# Patient Record
Sex: MALE | Race: WHITE | NOT HISPANIC OR LATINO | Employment: OTHER | URBAN - NONMETROPOLITAN AREA
[De-identification: names, ages, dates, MRNs, and addresses within clinical notes are randomized per-mention and may not be internally consistent; named-entity substitution may affect disease eponyms.]

---

## 2024-11-20 ENCOUNTER — PATIENT ROUNDING (BHMG ONLY) (OUTPATIENT)
Dept: CARDIOLOGY | Facility: CLINIC | Age: 69
End: 2024-11-20
Payer: MEDICARE

## 2024-11-20 ENCOUNTER — OFFICE VISIT (OUTPATIENT)
Dept: CARDIOLOGY | Facility: CLINIC | Age: 69
End: 2024-11-20
Payer: MEDICARE

## 2024-11-20 VITALS
DIASTOLIC BLOOD PRESSURE: 115 MMHG | OXYGEN SATURATION: 95 % | HEART RATE: 93 BPM | BODY MASS INDEX: 26.8 KG/M2 | HEIGHT: 68 IN | SYSTOLIC BLOOD PRESSURE: 217 MMHG | WEIGHT: 176.8 LBS

## 2024-11-20 DIAGNOSIS — R55 SYNCOPE AND COLLAPSE: ICD-10-CM

## 2024-11-20 DIAGNOSIS — I26.99 RECURRENT PULMONARY EMBOLISM: ICD-10-CM

## 2024-11-20 DIAGNOSIS — E78.2 MIXED HYPERLIPIDEMIA: ICD-10-CM

## 2024-11-20 DIAGNOSIS — I10 ESSENTIAL HYPERTENSION: Primary | ICD-10-CM

## 2024-11-20 DIAGNOSIS — R06.02 SOB (SHORTNESS OF BREATH): ICD-10-CM

## 2024-11-20 DIAGNOSIS — R55 PRE-SYNCOPE: ICD-10-CM

## 2024-11-20 DIAGNOSIS — R01.1 HEART MURMUR: ICD-10-CM

## 2024-11-20 DIAGNOSIS — R00.2 PALPITATION: ICD-10-CM

## 2024-11-20 DIAGNOSIS — I82.499 DEEP VEIN THROMBOSIS (DVT) OF OTHER VEIN OF LOWER EXTREMITY, UNSPECIFIED CHRONICITY, UNSPECIFIED LATERALITY: ICD-10-CM

## 2024-11-20 PROBLEM — I82.409 DVT (DEEP VENOUS THROMBOSIS): Status: ACTIVE | Noted: 2024-11-20

## 2024-11-20 PROBLEM — R42 DIZZINESS: Status: ACTIVE | Noted: 2024-11-20

## 2024-11-20 PROBLEM — Z79.01 LONG TERM (CURRENT) USE OF ANTICOAGULANTS: Status: ACTIVE | Noted: 2024-11-20

## 2024-11-20 PROBLEM — Z72.0 SMOKELESS TOBACCO USE: Status: ACTIVE | Noted: 2024-11-20

## 2024-11-20 RX ORDER — OLMESARTAN MEDOXOMIL 20 MG/1
20 TABLET ORAL DAILY
Qty: 30 TABLET | Refills: 11 | Status: SHIPPED | OUTPATIENT
Start: 2024-11-20

## 2024-11-20 RX ORDER — METOPROLOL TARTRATE 25 MG/1
12.5 TABLET, FILM COATED ORAL 2 TIMES DAILY
Qty: 30 TABLET | Refills: 11 | Status: SHIPPED | OUTPATIENT
Start: 2024-11-20

## 2024-11-20 RX ORDER — WARFARIN SODIUM 5 MG/1
TABLET ORAL
COMMUNITY
End: 2024-11-20 | Stop reason: ALTCHOICE

## 2024-11-20 RX ORDER — DIPHENOXYLATE HYDROCHLORIDE AND ATROPINE SULFATE 2.5; .025 MG/1; MG/1
1 TABLET ORAL DAILY
COMMUNITY

## 2024-11-20 RX ORDER — AMLODIPINE BESYLATE 2.5 MG/1
2.5 TABLET ORAL DAILY
Qty: 30 TABLET | Refills: 11 | Status: SHIPPED | OUTPATIENT
Start: 2024-11-20

## 2024-11-20 NOTE — PROGRESS NOTES
November 20, 2024    Hello, may I speak with Carlton Coy?    My name is HYUN MARLOW      I am  with [facility]  [address] [place] [id number] [phone].    Before we get started may I verify your date of birth? 1955    I am calling to officially welcome you to our practice and ask about your recent visit. Is this a good time to talk? no    NO ANSWER, UNABLE TO LVM      Thank you, and have a great day.

## 2024-11-20 NOTE — PROGRESS NOTES
"Subjective   Carlton Coy is a 69 y.o. male     Chief Complaint   Patient presents with   • Establish Care     Here for eval. Per pcp   • Shortness of Breath   • Deep Vein Thrombosis   • Pulmonary Embolism   • Hypertension   • Hyperlipidemia       PROBLEM LIST:     Pre-syncope  HTN  Hyperlipidemia  PE / DVT  approx. 20-25 yrs. Ago. On coumadin. Monitored by pcp  SOB  Smokeless tobacco use  Heart murmur    Specialty Problems    None        HPI:    Mr. Wu Coy is a 69-year-old patient of Radha Jung seen today to establish care.    Mr. Coy has a history of unprovoked DVT with subsequent pulmonary embolism.  He ultimately underwent IVC filter placement and has been managed on warfarin.  He has had no symptoms of recurrence.    Mr. Coy is very physically active per his report.  He does active yard work and heavy physical activity without chest pain or undue dyspnea.  He states that he does not have as much \"energy\" as he did last year but describes no increase in exertional dyspnea or decrease in functional capacity.    The patient describes no symptoms to implicate heart failure or malignant dysrhythmia.  He has no symptoms to suggest peripheral arterial disease or arterial embolic events.                  PRIOR MEDICATIONS    Current Outpatient Medications on File Prior to Visit   Medication Sig Dispense Refill   • multivitamin (MULTIVITAMIN PO) Take 1 tablet by mouth Daily.     • warfarin (COUMADIN) 5 MG tablet 5 mg daily except doesn't take on Tuesday's. PCP monitors       No current facility-administered medications on file prior to visit.       ALLERGIES:    Penicillin g    PAST MEDICAL HISTORY:    Past Medical History:   Diagnosis Date   • Deep vein thrombosis    • Hyperlipidemia    • Hypertension    • Pulmonary embolism        SURGICAL HISTORY:    Past Surgical History:   Procedure Laterality Date   • APPENDECTOMY     • OTHER SURGICAL HISTORY      filter placed in left groin for HX clots approx. 20-25 yrs. " "ago       SOCIAL HISTORY:    Social History     Socioeconomic History   • Marital status:    Tobacco Use   • Smoking status: Never   • Smokeless tobacco: Current   Substance and Sexual Activity   • Alcohol use: Never   • Drug use: Never       FAMILY HISTORY:    Family History   Problem Relation Age of Onset   • Heart attack Mother    • Pulmonary embolism Mother    • Heart attack Father        Review of Systems   Constitutional:  Positive for fatigue (depends on activity. does fine with doing things below the waist). Negative for chills, diaphoresis, fever and unexpected weight change.   HENT: Negative.     Eyes:  Positive for visual disturbance (glasses prn).   Respiratory:  Positive for shortness of breath.         Denies orthopnea/PND   Cardiovascular:  Positive for palpitations (occured over the sumnmer with diet change. stopped with changing diet back) and leg swelling (if sits around alot). Negative for chest pain.   Gastrointestinal:  Positive for nausea. Negative for blood in stool, constipation and diarrhea.   Endocrine: Positive for cold intolerance.   Genitourinary: Negative.    Musculoskeletal:  Positive for arthralgias and myalgias.        Denies leg cramps with ambulation   Skin: Negative.    Allergic/Immunologic: Negative.    Neurological:  Positive for dizziness, syncope (first episode in May occured while \"experimenting\" on foods while walking to garage to show son something. last episode approx. 2 week later while shaving in [place].) and light-headedness.   Hematological:  Bruises/bleeds easily (on coumadin).   Psychiatric/Behavioral:  Positive for sleep disturbance.        VISIT VITALS:  Vitals:    11/20/24 1133 11/20/24 1225   BP: (!) 173/101 (!) 217/115   BP Location: Left arm Left arm   Patient Position: Sitting Sitting   Pulse: 93    SpO2: 95%    Weight: 80.2 kg (176 lb 12.8 oz)    Height: 172.7 cm (68\")       BP (!) 217/115 (BP Location: Left arm, Patient Position: Sitting)   Pulse 93   " "Ht 172.7 cm (68\")   Wt 80.2 kg (176 lb 12.8 oz)   SpO2 95%   BMI 26.88 kg/m²     RECENT LABS:    Objective       Physical Exam  Vitals and nursing note reviewed.   Constitutional:       General: He is not in acute distress.     Appearance: He is well-developed.   HENT:      Head: Normocephalic and atraumatic.   Eyes:      Conjunctiva/sclera: Conjunctivae normal.      Pupils: Pupils are equal, round, and reactive to light.   Neck:      Vascular: No carotid bruit, hepatojugular reflux or JVD.      Trachea: No tracheal deviation.      Comments: Nl. Carotid upstrokes  Cardiovascular:      Rate and Rhythm: Normal rate and regular rhythm.      Pulses:           Radial pulses are 2+ on the right side and 2+ on the left side.      Heart sounds: S1 normal and S2 normal. Murmur heard.      S4 (loud) sounds present.      Comments: 1/6 TR  NO MR  NO AI  Pulmonary:      Effort: Pulmonary effort is normal.      Breath sounds: Normal breath sounds. No wheezing, rhonchi or rales.      Comments: Nl. Expir. Phase  Nl. Breath sound intensity  Abdominal:      General: Bowel sounds are normal. There is no distension or abdominal bruit.      Palpations: Abdomen is soft. There is no mass.      Tenderness: There is no abdominal tenderness. There is no guarding or rebound.      Comments: No organomegaly   Musculoskeletal:         General: No tenderness or deformity. Normal range of motion.      Cervical back: Normal range of motion and neck supple.      Right lower leg: No edema.      Left lower leg: No edema.      Comments: LLE, no edema, palpable pedal pulses  RLE, no edema, palpable pedal pulses   Skin:     General: Skin is warm and dry.      Coloration: Skin is not pale.      Findings: No erythema or rash.   Neurological:      Mental Status: He is alert and oriented to person, place, and time.   Psychiatric:         Behavior: Behavior normal.         Thought Content: Thought content normal.         Judgment: Judgment normal. "         ECG 12 Lead    Date/Time: 11/20/2024 12:09 PM  Performed by: Tomas Garcia MD    Authorized by: Tomas Garcia MD  Previous ECG: no previous ECG available  Comments: Restarted normal sinus rhythm at 70 bpm.  Right bundle branch block, right axis deviation.          Assessment & Plan   #1.  Severe hypertension.  Blood pressures as recorded above.  We will start amlodipine 2.5 mg daily metoprolol 12.5 mg twice daily, and olmesartan 20 mg daily.  The patient will keep a serial blood pressure log at home.    2.  History of unprovoked DVT/PE with a strong family history of DVT.  The patient will hold warfarin for 48 hours and start Xarelto 20 mg daily.    3.  Exertional dyspnea.  I do not believe this is an anginal equivalent or a marker for LV systolic dysfunction.  I would like to see the patient back after managing blood pressure to make a determination whether further evaluation is indicated.    4.  Presyncope.  The patient describes several episodes in May of presyncope.  He had no palpitations, chest pain, or shortness of breath at that time.  He has had no recurrence of symptoms.  We will monitor closely to determine if further evaluation is indicated.    5.  Mr. Coy will follow with Radha Erich as instructed we will plan on seeing him in follow-up in 4 to 6 weeks or on appearing basis as discussed.   Diagnosis Plan   1. Essential hypertension        2. Mixed hyperlipidemia        3. Deep vein thrombosis (DVT) of other vein of lower extremity, unspecified chronicity, unspecified laterality        4. Recurrent pulmonary embolism        5. Syncope and collapse  ECG 12 Lead      6. Palpitation  ECG 12 Lead      7. SOB (shortness of breath)  ECG 12 Lead      8. Pre-syncope        9. Heart murmur            No follow-ups on file.         Carlton Coy  reports that he has never smoked. He uses smokeless tobacco. I have educated him on the risk of diseases from using tobacco products such as cancer,  COPD, and heart disease.     I advised him to quit and he is not willing to quit.    I spent 3  minutes counseling the patient.          BMI is >= 25 and <30. (Overweight) The following options were offered after discussion;: pcp addressing       Advance Care Planning   ACP discussion was held with the patient during this visit. Patient does not have an advance directive, declines further assistance.            Electronically signed by:    Scribed for Tomas Garcia MD by Jeanette Norwood LPN on November 20, 2024  at 12:27 EST    I, Tomas Garcia MD personally performed the services described in this documentation as scribed by the above named individual in my presence, and it is both accurate and complete. November 20, 2024 12:27 EST      Dictated Utilizing Dragon Dictation: Part of this note may be an electronic transcription/translation of spoken language to printed text using the Dragon Dictation System.

## 2024-11-21 ENCOUNTER — TELEPHONE (OUTPATIENT)
Dept: CARDIOLOGY | Facility: CLINIC | Age: 69
End: 2024-11-21
Payer: MEDICARE

## 2024-11-21 NOTE — TELEPHONE ENCOUNTER
RELAY  Lvm to obtain the MBI number on the patients medicare card. To link to Medicare advantage plan.

## 2024-12-09 ENCOUNTER — OFFICE VISIT (OUTPATIENT)
Dept: CARDIOLOGY | Facility: CLINIC | Age: 69
End: 2024-12-09
Payer: MEDICARE

## 2024-12-09 VITALS
WEIGHT: 135 LBS | SYSTOLIC BLOOD PRESSURE: 173 MMHG | HEIGHT: 68 IN | HEART RATE: 58 BPM | BODY MASS INDEX: 20.46 KG/M2 | DIASTOLIC BLOOD PRESSURE: 70 MMHG | OXYGEN SATURATION: 97 %

## 2024-12-09 DIAGNOSIS — R01.1 HEART MURMUR: ICD-10-CM

## 2024-12-09 DIAGNOSIS — I26.99 RECURRENT PULMONARY EMBOLISM: ICD-10-CM

## 2024-12-09 DIAGNOSIS — E78.2 MIXED HYPERLIPIDEMIA: ICD-10-CM

## 2024-12-09 DIAGNOSIS — Z72.0 SMOKELESS TOBACCO USE: ICD-10-CM

## 2024-12-09 DIAGNOSIS — I10 ESSENTIAL HYPERTENSION: Primary | ICD-10-CM

## 2024-12-09 DIAGNOSIS — R06.02 SOB (SHORTNESS OF BREATH): ICD-10-CM

## 2024-12-09 DIAGNOSIS — R00.2 PALPITATION: ICD-10-CM

## 2024-12-09 DIAGNOSIS — I82.499 DEEP VEIN THROMBOSIS (DVT) OF OTHER VEIN OF LOWER EXTREMITY, UNSPECIFIED CHRONICITY, UNSPECIFIED LATERALITY: ICD-10-CM

## 2024-12-09 RX ORDER — METOPROLOL TARTRATE 25 MG/1
25 TABLET, FILM COATED ORAL 2 TIMES DAILY
Qty: 60 TABLET | Refills: 11 | Status: SHIPPED | OUTPATIENT
Start: 2024-12-09

## 2024-12-09 RX ORDER — OLMESARTAN MEDOXOMIL 40 MG/1
40 TABLET ORAL DAILY
Qty: 30 TABLET | Refills: 11 | Status: SHIPPED | OUTPATIENT
Start: 2024-12-09

## 2024-12-09 RX ORDER — AMLODIPINE BESYLATE 5 MG/1
5 TABLET ORAL DAILY
Qty: 30 TABLET | Refills: 11 | Status: SHIPPED | OUTPATIENT
Start: 2024-12-09

## 2024-12-09 NOTE — PROGRESS NOTES
Subjective   Carlton Coy is a 69 y.o. male     Chief Complaint   Patient presents with    Follow-up    Hypertension       PROBLEM LIST:     Pre-syncope  HTN  Hyperlipidemia  PE / DVT  approx. 20-25 yrs. Ago. On coumadin. Monitored by pcp  SOB  Smokeless tobacco use  Heart murmur    Specialty Problems          Cardiology Problems    Recurrent pulmonary embolism        DVT (deep venous thrombosis)        Essential hypertension        Heart murmur        Mixed hyperlipidemia        Palpitation        Pre-syncope             HPI:    Mr. Coy returns for follow-up on blood pressures.    He remains persistently hypertensive.  The patient describes compliance with medications as directed previously.  We did at the time of his last visit we change warfarin to Xarelto and the patient has already that changed without difficulty.  He describes no hemoptysis, hematemesis, melena, hematochezia or hematuria.                    PRIOR MEDICATIONS    Current Outpatient Medications on File Prior to Visit   Medication Sig Dispense Refill    amLODIPine (NORVASC) 2.5 MG tablet Take 1 tablet by mouth Daily. 30 tablet 11    metoprolol tartrate (LOPRESSOR) 25 MG tablet Take 0.5 tablets by mouth 2 (Two) Times a Day. (Patient taking differently: Take 1 tablet by mouth 2 (Two) Times a Day.) 30 tablet 11    olmesartan (Benicar) 20 MG tablet Take 1 tablet by mouth Daily. 30 tablet 11    rivaroxaban (XARELTO) 20 MG tablet HOLD COUMADIN X2 DAYS THEN START XARELTO 20 MG DAILY (Patient taking differently: Take 1 tablet by mouth Daily With Dinner. HOLD COUMADIN X2 DAYS THEN START XARELTO 20 MG DAILY) 30 tablet 11    [DISCONTINUED] multivitamin (MULTIVITAMIN PO) Take 1 tablet by mouth Daily.       No current facility-administered medications on file prior to visit.       ALLERGIES:    Penicillin g    PAST MEDICAL HISTORY:    Past Medical History:   Diagnosis Date    Deep vein thrombosis     Hyperlipidemia     Hypertension     Pulmonary embolism   "      SURGICAL HISTORY:    Past Surgical History:   Procedure Laterality Date    APPENDECTOMY      OTHER SURGICAL HISTORY      filter placed in left groin for HX clots approx. 20-25 yrs. ago       SOCIAL HISTORY:    Social History     Socioeconomic History    Marital status:    Tobacco Use    Smoking status: Never    Smokeless tobacco: Current   Substance and Sexual Activity    Alcohol use: Never    Drug use: Never       FAMILY HISTORY:    Family History   Problem Relation Age of Onset    Heart attack Mother     Pulmonary embolism Mother     Heart attack Father        Review of Systems   Constitutional: Negative.    HENT: Negative.     Eyes:  Positive for visual disturbance (glasses prn).   Respiratory: Negative.     Cardiovascular: Negative.    Gastrointestinal: Negative.    Endocrine: Negative.    Genitourinary:  Positive for frequency.   Musculoskeletal:  Positive for arthralgias and myalgias.   Skin: Negative.    Allergic/Immunologic: Negative.    Neurological: Negative.    Hematological:  Bruises/bleeds easily.   Psychiatric/Behavioral:  Positive for sleep disturbance.        VISIT VITALS:  Vitals:    12/09/24 1340   BP: 173/70   BP Location: Left arm   Patient Position: Sitting   Pulse: 58   SpO2: 97%   Weight: 61.2 kg (135 lb)   Height: 172.7 cm (67.99\")      /70 (BP Location: Left arm, Patient Position: Sitting)   Pulse 58   Ht 172.7 cm (67.99\")   Wt 61.2 kg (135 lb)   SpO2 97%   BMI 20.53 kg/m²     RECENT LABS:    Objective       Physical Exam    Procedures      Assessment & Plan   #1.  Persistent systemic hypertension.  We will increase amlodipine to 5 mg daily, metoprolol to 25 mg twice daily, and olmesartan to 40 mg daily.  The patient will continue blood pressure checks for telephonic follow-up in several weeks.    2.  History of DVT with pulmonary embolism.  We will continue Xarelto.    3.  Mr. Coy will follow with Radha Jung as instructed we will plan on seeing him in follow-up in " 6 months or on appearing basis as discussed.   Diagnosis Plan   1. Essential hypertension        2. Heart murmur        3. Mixed hyperlipidemia        4. Palpitation        5. Deep vein thrombosis (DVT) of other vein of lower extremity, unspecified chronicity, unspecified laterality        6. Recurrent pulmonary embolism        7. SOB (shortness of breath)        8. Smokeless tobacco use            No follow-ups on file.         Carlton Coy  reports that he has never smoked. He uses smokeless tobacco. I have educated him on the risk of diseases from using tobacco products such as cancer, COPD, and heart disease.     I advised him to quit and he is not willing to quit.    I spent 3  minutes counseling the patient.          BMI is within normal parameters. No other follow-up for BMI required.             Electronically signed by:    Scribed for Tomas Garcia MD by Jeanette Norwood LPN on December 9, 2024  at 13:49 EST    I, Tomas Garcia MD personally performed the services described in this documentation as scribed by the above named individual in my presence, and it is both accurate and complete. December 9, 2024 13:49 EST      Dictated Utilizing Dragon Dictation: Part of this note may be an electronic transcription/translation of spoken language to printed text using the Dragon Dictation System.

## 2025-05-12 DIAGNOSIS — I26.99 RECURRENT PULMONARY EMBOLISM: ICD-10-CM

## 2025-05-12 DIAGNOSIS — I82.499 DEEP VEIN THROMBOSIS (DVT) OF OTHER VEIN OF LOWER EXTREMITY, UNSPECIFIED CHRONICITY, UNSPECIFIED LATERALITY: ICD-10-CM

## 2025-07-07 ENCOUNTER — OFFICE VISIT (OUTPATIENT)
Dept: CARDIOLOGY | Facility: CLINIC | Age: 70
End: 2025-07-07
Payer: MEDICARE

## 2025-07-07 VITALS
WEIGHT: 137.4 LBS | OXYGEN SATURATION: 97 % | HEART RATE: 80 BPM | DIASTOLIC BLOOD PRESSURE: 88 MMHG | SYSTOLIC BLOOD PRESSURE: 162 MMHG | HEIGHT: 68 IN | BODY MASS INDEX: 20.82 KG/M2

## 2025-07-07 DIAGNOSIS — I10 ESSENTIAL HYPERTENSION: Primary | ICD-10-CM

## 2025-07-07 DIAGNOSIS — R06.02 SOB (SHORTNESS OF BREATH): ICD-10-CM

## 2025-07-07 DIAGNOSIS — I82.499 DEEP VEIN THROMBOSIS (DVT) OF OTHER VEIN OF LOWER EXTREMITY, UNSPECIFIED CHRONICITY, UNSPECIFIED LATERALITY: ICD-10-CM

## 2025-07-07 DIAGNOSIS — E78.2 MIXED HYPERLIPIDEMIA: ICD-10-CM

## 2025-07-07 DIAGNOSIS — R55 PRE-SYNCOPE: ICD-10-CM

## 2025-07-07 DIAGNOSIS — R01.1 HEART MURMUR: ICD-10-CM

## 2025-07-07 DIAGNOSIS — R00.2 PALPITATION: ICD-10-CM

## 2025-07-07 DIAGNOSIS — Z72.0 SMOKELESS TOBACCO USE: ICD-10-CM

## 2025-07-07 PROCEDURE — 99214 OFFICE O/P EST MOD 30 MIN: CPT | Performed by: INTERNAL MEDICINE

## 2025-07-07 PROCEDURE — 3077F SYST BP >= 140 MM HG: CPT | Performed by: INTERNAL MEDICINE

## 2025-07-07 PROCEDURE — 3079F DIAST BP 80-89 MM HG: CPT | Performed by: INTERNAL MEDICINE

## 2025-07-07 RX ORDER — OLMESARTAN MEDOXOMIL 20 MG/1
20 TABLET ORAL DAILY
Qty: 30 TABLET | Refills: 11 | Status: SHIPPED | OUTPATIENT
Start: 2025-07-07

## 2025-07-07 RX ORDER — DIPHENOXYLATE HYDROCHLORIDE AND ATROPINE SULFATE 2.5; .025 MG/1; MG/1
1 TABLET ORAL DAILY
COMMUNITY

## 2025-07-28 ENCOUNTER — LAB (OUTPATIENT)
Dept: LAB | Facility: HOSPITAL | Age: 70
End: 2025-07-28
Payer: MEDICARE

## 2025-07-28 DIAGNOSIS — I10 ESSENTIAL HYPERTENSION: ICD-10-CM

## 2025-07-28 LAB
ANION GAP SERPL CALCULATED.3IONS-SCNC: 11.6 MMOL/L (ref 5–15)
BUN SERPL-MCNC: 17 MG/DL (ref 8–23)
BUN/CREAT SERPL: 24.3 (ref 7–25)
CALCIUM SPEC-SCNC: 8.7 MG/DL (ref 8.6–10.5)
CHLORIDE SERPL-SCNC: 106 MMOL/L (ref 98–107)
CO2 SERPL-SCNC: 25.4 MMOL/L (ref 22–29)
CREAT SERPL-MCNC: 0.7 MG/DL (ref 0.76–1.27)
EGFRCR SERPLBLD CKD-EPI 2021: 99.1 ML/MIN/1.73
GLUCOSE SERPL-MCNC: 91 MG/DL (ref 65–99)
MAGNESIUM SERPL-MCNC: 2.2 MG/DL (ref 1.6–2.4)
POTASSIUM SERPL-SCNC: 3.7 MMOL/L (ref 3.5–5.2)
SODIUM SERPL-SCNC: 143 MMOL/L (ref 136–145)

## 2025-07-28 PROCEDURE — 83735 ASSAY OF MAGNESIUM: CPT

## 2025-07-28 PROCEDURE — 36415 COLL VENOUS BLD VENIPUNCTURE: CPT

## 2025-07-28 PROCEDURE — 80048 BASIC METABOLIC PNL TOTAL CA: CPT
